# Patient Record
Sex: FEMALE | Race: WHITE | Employment: OTHER | URBAN - METROPOLITAN AREA
[De-identification: names, ages, dates, MRNs, and addresses within clinical notes are randomized per-mention and may not be internally consistent; named-entity substitution may affect disease eponyms.]

---

## 2019-05-20 ENCOUNTER — HOSPITAL ENCOUNTER (INPATIENT)
Facility: HOSPITAL | Age: 84
LOS: 4 days | Discharge: HOME | DRG: 872 | End: 2019-05-25
Attending: EMERGENCY MEDICINE | Admitting: INTERNAL MEDICINE
Payer: MEDICARE

## 2019-05-20 ENCOUNTER — APPOINTMENT (OUTPATIENT)
Dept: GENERAL RADIOLOGY | Facility: HOSPITAL | Age: 84
DRG: 872 | End: 2019-05-20
Attending: EMERGENCY MEDICINE
Payer: MEDICARE

## 2019-05-20 DIAGNOSIS — N30.00 ACUTE CYSTITIS WITHOUT HEMATURIA: Primary | ICD-10-CM

## 2019-05-20 DIAGNOSIS — R00.0 TACHYCARDIA: ICD-10-CM

## 2019-05-20 DIAGNOSIS — J44.1 COPD EXACERBATION (HCC): ICD-10-CM

## 2019-05-20 DIAGNOSIS — N39.0 SEPSIS DUE TO URINARY TRACT INFECTION (HCC): ICD-10-CM

## 2019-05-20 DIAGNOSIS — A41.9 SEPSIS DUE TO URINARY TRACT INFECTION (HCC): ICD-10-CM

## 2019-05-20 PROCEDURE — 71045 X-RAY EXAM CHEST 1 VIEW: CPT | Performed by: EMERGENCY MEDICINE

## 2019-05-20 RX ORDER — METHYLPREDNISOLONE SODIUM SUCCINATE 125 MG/2ML
125 INJECTION, POWDER, LYOPHILIZED, FOR SOLUTION INTRAMUSCULAR; INTRAVENOUS ONCE
Status: COMPLETED | OUTPATIENT
Start: 2019-05-20 | End: 2019-05-20

## 2019-05-20 RX ORDER — ALBUTEROL SULFATE 2.5 MG/3ML
5 SOLUTION RESPIRATORY (INHALATION) CONTINUOUS
Status: DISCONTINUED | OUTPATIENT
Start: 2019-05-20 | End: 2019-05-21 | Stop reason: ALTCHOICE

## 2019-05-20 RX ORDER — LEVOFLOXACIN 5 MG/ML
750 INJECTION, SOLUTION INTRAVENOUS ONCE
Status: COMPLETED | OUTPATIENT
Start: 2019-05-20 | End: 2019-05-21

## 2019-05-21 ENCOUNTER — APPOINTMENT (OUTPATIENT)
Dept: ULTRASOUND IMAGING | Facility: HOSPITAL | Age: 84
DRG: 872 | End: 2019-05-21
Attending: INTERNAL MEDICINE
Payer: MEDICARE

## 2019-05-21 PROBLEM — R00.0 TACHYCARDIA: Status: ACTIVE | Noted: 2019-05-21

## 2019-05-21 PROBLEM — N39.0 SEPSIS DUE TO URINARY TRACT INFECTION (HCC): Status: ACTIVE | Noted: 2019-05-21

## 2019-05-21 PROBLEM — J44.1 COPD EXACERBATION (HCC): Status: ACTIVE | Noted: 2019-05-21

## 2019-05-21 PROBLEM — A41.9 SEPSIS DUE TO URINARY TRACT INFECTION: Status: ACTIVE | Noted: 2019-05-21

## 2019-05-21 PROBLEM — N39.0 SEPSIS DUE TO URINARY TRACT INFECTION: Status: ACTIVE | Noted: 2019-05-21

## 2019-05-21 PROBLEM — A41.9 SEPSIS DUE TO URINARY TRACT INFECTION (HCC): Status: ACTIVE | Noted: 2019-05-21

## 2019-05-21 PROCEDURE — 93923 UPR/LXTR ART STDY 3+ LVLS: CPT | Performed by: INTERNAL MEDICINE

## 2019-05-21 PROCEDURE — 99223 1ST HOSP IP/OBS HIGH 75: CPT | Performed by: INTERNAL MEDICINE

## 2019-05-21 RX ORDER — SODIUM CHLORIDE 9 MG/ML
INJECTION, SOLUTION INTRAVENOUS CONTINUOUS
Status: DISCONTINUED | OUTPATIENT
Start: 2019-05-21 | End: 2019-05-21

## 2019-05-21 RX ORDER — RANITIDINE 150 MG/1
150 TABLET ORAL DAILY
COMMUNITY

## 2019-05-21 RX ORDER — LISINOPRIL 5 MG/1
5 TABLET ORAL DAILY
Status: DISCONTINUED | OUTPATIENT
Start: 2019-05-22 | End: 2019-05-21

## 2019-05-21 RX ORDER — CILOSTAZOL 100 MG/1
100 TABLET ORAL 2 TIMES DAILY
COMMUNITY

## 2019-05-21 RX ORDER — PREDNISONE 10 MG/1
10 TABLET ORAL DAILY
COMMUNITY

## 2019-05-21 RX ORDER — LISINOPRIL AND HYDROCHLOROTHIAZIDE 12.5; 1 MG/1; MG/1
1 TABLET ORAL DAILY
COMMUNITY

## 2019-05-21 RX ORDER — POTASSIUM CHLORIDE 1.5 G/1.77G
40 POWDER, FOR SOLUTION ORAL EVERY 4 HOURS
Status: COMPLETED | OUTPATIENT
Start: 2019-05-21 | End: 2019-05-21

## 2019-05-21 RX ORDER — ALBUTEROL SULFATE 90 UG/1
AEROSOL, METERED RESPIRATORY (INHALATION) AS NEEDED
COMMUNITY

## 2019-05-21 RX ORDER — FLUTICASONE PROPIONATE 50 MCG
1 SPRAY, SUSPENSION (ML) NASAL DAILY
COMMUNITY

## 2019-05-21 RX ORDER — FAMOTIDINE 10 MG
10 TABLET ORAL DAILY
Status: DISCONTINUED | OUTPATIENT
Start: 2019-05-21 | End: 2019-05-25

## 2019-05-21 RX ORDER — ACETAMINOPHEN 500 MG
500 TABLET ORAL EVERY 6 HOURS PRN
Status: DISCONTINUED | OUTPATIENT
Start: 2019-05-21 | End: 2019-05-25

## 2019-05-21 RX ORDER — CARVEDILOL 6.25 MG/1
6.25 TABLET ORAL 2 TIMES DAILY WITH MEALS
COMMUNITY

## 2019-05-21 RX ORDER — LISINOPRIL 5 MG/1
5 TABLET ORAL DAILY
Status: DISCONTINUED | OUTPATIENT
Start: 2019-05-21 | End: 2019-05-25

## 2019-05-21 RX ORDER — METHYLPREDNISOLONE SODIUM SUCCINATE 40 MG/ML
40 INJECTION, POWDER, LYOPHILIZED, FOR SOLUTION INTRAMUSCULAR; INTRAVENOUS EVERY 12 HOURS
Status: DISCONTINUED | OUTPATIENT
Start: 2019-05-21 | End: 2019-05-21

## 2019-05-21 RX ORDER — PREDNISONE 10 MG/1
10 TABLET ORAL
Status: DISCONTINUED | OUTPATIENT
Start: 2019-05-21 | End: 2019-05-25

## 2019-05-21 RX ORDER — HEPARIN SODIUM 5000 [USP'U]/ML
5000 INJECTION, SOLUTION INTRAVENOUS; SUBCUTANEOUS EVERY 12 HOURS SCHEDULED
Status: DISCONTINUED | OUTPATIENT
Start: 2019-05-21 | End: 2019-05-25

## 2019-05-21 RX ORDER — IPRATROPIUM BROMIDE AND ALBUTEROL SULFATE 2.5; .5 MG/3ML; MG/3ML
3 SOLUTION RESPIRATORY (INHALATION)
Status: DISCONTINUED | OUTPATIENT
Start: 2019-05-21 | End: 2019-05-22

## 2019-05-21 RX ORDER — HYDRALAZINE HYDROCHLORIDE 20 MG/ML
10 INJECTION INTRAMUSCULAR; INTRAVENOUS EVERY 6 HOURS PRN
Status: DISCONTINUED | OUTPATIENT
Start: 2019-05-21 | End: 2019-05-25

## 2019-05-21 RX ORDER — VENLAFAXINE 75 MG/1
75 TABLET ORAL NIGHTLY
COMMUNITY

## 2019-05-21 RX ORDER — HYDRALAZINE HYDROCHLORIDE 20 MG/ML
INJECTION INTRAMUSCULAR; INTRAVENOUS
Status: DISPENSED
Start: 2019-05-21 | End: 2019-05-22

## 2019-05-21 RX ORDER — SODIUM CHLORIDE 450 MG/100ML
INJECTION, SOLUTION INTRAVENOUS CONTINUOUS
Status: DISCONTINUED | OUTPATIENT
Start: 2019-05-21 | End: 2019-05-22

## 2019-05-21 RX ORDER — GARLIC EXTRACT 500 MG
1 CAPSULE ORAL DAILY
Status: DISCONTINUED | OUTPATIENT
Start: 2019-05-21 | End: 2019-05-25

## 2019-05-21 RX ORDER — VENLAFAXINE 75 MG/1
150 TABLET ORAL DAILY
COMMUNITY

## 2019-05-21 RX ORDER — VIT A/VIT C/VIT E/ZINC/COPPER 2148-113
TABLET ORAL DAILY
COMMUNITY

## 2019-05-21 NOTE — PLAN OF CARE
Problem: CARDIOVASCULAR - ADULT  Goal: Maintains optimal cardiac output and hemodynamic stability  Description  INTERVENTIONS:  - Monitor vital signs, rhythm, and trends  - Monitor for bleeding, hypotension and signs of decreased cardiac output  - Evalua like us to know as we care for you? I like my room warm and extra blankets.     Provide timely, complete, and accurate information to patient/family  - Incorporate patient and family knowledge, values, beliefs, and cultural backgrounds into the planning an Advance activity as appropriate  - Communicate ordered activity level and limitations with patient/family  Outcome: Not Progressing   Patient is in bed; awake/alert ox3, has fatigue and weakness. SR and vital signs are stable, 02 sats 96% on room air.   D

## 2019-05-21 NOTE — PLAN OF CARE
Dr Tabitha Richard paged regarding patient's high BP. Discontinued 0.9% NaCl and ordered 0.45% NaCl at a lower rate of 50cc/hr. Will continue to monitor.

## 2019-05-21 NOTE — ED NOTES
Sepsis note:     Total volume received  1950 ml    Time Blood Cultures Drawn: 2338    Time first antibiotic started:  2341    Lactic acid = 4.4

## 2019-05-21 NOTE — PROGRESS NOTES
Dr Melissa Pacheco / addendum :   Patient overall feeling much better  Not on pressors and hemodynamically stable  No chest pain or shortness of breath no abdominal pain  Patient chronically on steroid for arthritis which changed to baseline 10 mg once a day  I dis

## 2019-05-21 NOTE — PROGRESS NOTES
[facility]      Sepsis Reassessment Note    /75   Pulse 113   Temp 97.3 °F (36.3 °C) (Temporal)   Resp 19   Ht 5' 3\" (1.6 m)   Wt 143 lb (64.9 kg)   SpO2 94%   BMI 25.33 kg/m²      1:05 AM    Cardiac:  Regularity: Regular  Rate:  Ta

## 2019-05-21 NOTE — PLAN OF CARE
Assessment done on patient and mottling noted on knees and lower extremites. Patient states they were that way yesterday when she came in to the ER. Pedal pulses were assessed and could not be found. Doppler was used and pulse still not found.  Second nurse

## 2019-05-21 NOTE — CONSULTS
[facility]    Report of Consultation    [facility] Patient Status:  Emergency    1935 MRN [id number]   Location [address] Attending No att. providers found   Hosp Day # 0 PCP PHYSICIAN NONSTAFF     Date ADD-vantage 3.375 g Intravenous Once   albuterol sulfate (VENTOLIN) (2.5 MG/3ML) 0.083% nebulizer solution 5 mg 5 mg Nebulization Continuous   Ipratropium Bromide (ATROVENT) 0.02 % nebulizer solution 0.5 mg 0.5 mg Nebulization Continuous       (Not in a ho prior multiple angioplasties and revascularization to the lower extremity  6– history of CAD and HTN and diastolic CHF  LVEF 55 %   7- GI prophylaxis with pepcid / h/o GERD   8- DVT prophylaxis with heparin sq   9–full code    Plan :   Sepsis reassessment

## 2019-05-21 NOTE — PLAN OF CARE
Problem: Patient/Family Goals  Goal: Patient/Family Long Term Goal  Description  Patient's Long Term Goal: To go home    Interventions:  - Monitor labs  -Monitor vitals  -Antibiotics  - See additional Care Plan goals for specific interventions  Outcome: place. Patient complained of a headache and tylenol was given with relief. Pt pedal pulses were unable to be found around 1600. Doppler used and was still not found. Arterial doppler ordered by Dr Peri Bosworth and vascular surgeon was consulted.  Lisinipril resume

## 2019-05-21 NOTE — H&P
[facility]    PATIENT'S NAME: EVERETTE Marroquin   ATTENDING PHYSICIAN: Adrienne Sy MD   PATIENT ACCOUNT#:   [id number]    LOCATION:  [address] [place] [address] RECORD #:   [id number]       YOB: 1935  ADMISSION DATE:       05/20/2019 and the patient denied severe headache, double vision, swallowing difficulty, chest pain, nausea, vomiting, urinary incontinence, neck swelling, calf swelling, tremors, focal weakness of the upper or lower extremities, bodily injuries, or seizure-like acti

## 2019-05-21 NOTE — PROGRESS NOTES
[facility] Pharmacy Note:  Renal Dose Adjustment    Vasyl Triplett has been prescribed famotidine (PEPCID) 10 mg orally every 12 hours. Estimated Creatinine Clearance: 24.2 mL/min (A) (based on SCr of 1.48 mg/dL (H)).     Her calculated creatinine clearance is < 5

## 2019-05-21 NOTE — ED PROVIDER NOTES
Patient Seen in: [facility] AND [facility] Emergency Department    History   Patient presents with:  Nausea/Vomiting/Diarrhea (gastrointestinal)    Stated Complaint: diaphoresis, diarrhea    HPI    71-year-old female with past medical history significant for  O2 Device 05/20/19 2048 None (Room air)       Current:/81   Pulse 119   Temp 97.3 °F (36.3 °C) (Temporal)   Resp 18   Ht 160 cm (5' 3\")   Wt 64.9 kg   SpO2 100%   BMI 25.33 kg/m²         Physical Exam    Physical Exam   Constitutional: AAOx3, well 21.7 (*)     RBC 5.80 (*)     HGB 17.8 (*)     HCT 55.8 (*)     RDW-SD 46.9 (*)     Neutrophil Absolute Prelim 19.53 (*)     All other components within normal limits   CBC WITH DIFFERENTIAL WITH PLATELET    Narrative:      The following orders were created well as a white blood cell count of 21,000. Patient now spells me that she has not been feeling well for the past 2 to 3 days but had just attributed to old age. Patient may have been having symptoms of UTI making her not feel well.   Patient also has ent specified. We recommend that you schedule follow up care with a primary care provider within the next three months to obtain basic health screening including reassessment of your blood pressure.     Medications Prescribed:  There are no discharge medicatio

## 2019-05-21 NOTE — PROGRESS NOTES
[facility]    Progress Note    Shira Rater Patient Status:  Inpatient    1935 MRN [id number]   Location [facility] 2W/SW Attending Catarina Galindo MD   Hosp Day # 0 PCP PHYSICIAN NONSTAFF        Subjective:     Constitutio 137 05/20/2019    K  05/20/2019      Comment:      Test not reported due to hemolysis.        05/20/2019    CO2 21.0 05/20/2019     (H) 05/20/2019    CA 10.3 (H) 05/20/2019       Xr Chest Ap Portable  (cpt=71045)    Result Date: 5/20/2019  CONC

## 2019-05-22 ENCOUNTER — APPOINTMENT (OUTPATIENT)
Dept: GENERAL RADIOLOGY | Facility: HOSPITAL | Age: 84
DRG: 872 | End: 2019-05-22
Attending: INTERNAL MEDICINE
Payer: MEDICARE

## 2019-05-22 PROCEDURE — 99233 SBSQ HOSP IP/OBS HIGH 50: CPT | Performed by: INTERNAL MEDICINE

## 2019-05-22 PROCEDURE — 71045 X-RAY EXAM CHEST 1 VIEW: CPT | Performed by: INTERNAL MEDICINE

## 2019-05-22 RX ORDER — CARVEDILOL 6.25 MG/1
6.25 TABLET ORAL 2 TIMES DAILY WITH MEALS
Status: DISCONTINUED | OUTPATIENT
Start: 2019-05-22 | End: 2019-05-25

## 2019-05-22 RX ORDER — IPRATROPIUM BROMIDE AND ALBUTEROL SULFATE 2.5; .5 MG/3ML; MG/3ML
3 SOLUTION RESPIRATORY (INHALATION) EVERY 6 HOURS PRN
Status: DISCONTINUED | OUTPATIENT
Start: 2019-05-22 | End: 2019-05-25

## 2019-05-22 RX ORDER — LEVOFLOXACIN 5 MG/ML
750 INJECTION, SOLUTION INTRAVENOUS
Status: DISCONTINUED | OUTPATIENT
Start: 2019-05-22 | End: 2019-05-24

## 2019-05-22 NOTE — PLAN OF CARE
PATIENT Matthias Shongaloo PER Dr. Isai Esteban. ORDER PLACED. TRANSFERRING TO ROOM 347. DAUGHTER, MICKIE, UPDATED. REPORT GIVEN TO KELBY RN, ALL QUESTIONS ANSWERED. ALL BELONGINGS SENT WITH PATIENT. Double RN skin check done prior to transfer off Unit.  Ayo Davis

## 2019-05-22 NOTE — PROGRESS NOTES
[facility] Pharmacy Note:  Renal Adjustment for levofloxacin (Curtis Larios)    Melinda Coles is a 82year old female who has been prescribed levofloxacin (LEVAQUIN) 750 mg every 24 hrs.   CrCl is estimated creatinine clearance is 36.6 mL/min (based on SCr of 0.98 mg/dL)

## 2019-05-22 NOTE — PLAN OF CARE
Problem: Patient Centered Care  Goal: Patient preferences are identified and integrated in the patient's plan of care  Description  Interventions:  - What would you like us to know as we care for you? To return home.   - Provide timely, complete, and accu measures for life threatening arrhythmias  - Monitor electrolytes and administer replacement therapy as ordered  Outcome: Progressing     Problem: RESPIRATORY - ADULT  Goal: Achieves optimal ventilation and oxygenation  Description  INTERVENTIONS:  - Asses

## 2019-05-22 NOTE — PROGRESS NOTES
[facility]    Progress Note    Shelbie Novak Patient Status:  Inpatient    1935 MRN [id number]   Location [facility] 2W/SW Attending Angelina Henning MD   Hosp Day # 1 PCP PHYSICIAN NONSTAFF        Subjective:     Jongtiирина K 3.8 05/22/2019     05/22/2019    CO2 19.0 (L) 05/22/2019    GLU 94 05/22/2019    CA 7.9 (L) 05/22/2019    ALB 3.2 (L) 05/22/2019    ALKPHO 46 (L) 05/22/2019    BILT 0.3 05/22/2019    TP 6.3 (L) 05/22/2019    AST 47 (H) 05/22/2019    ALT 31 05/22 for Inpatient Hospitalization - Initial Certification       Patient will require inpatient services that will reasonably be expected to span two midnight's based on the clinical documentation in H+P.    Based on patients current state of illness, I anticipa

## 2019-05-22 NOTE — PLAN OF CARE
Dr Mohit Montes De Oca paged again at 200 regarding patient still being hypertensive.  Passed information on to oncoming RN

## 2019-05-22 NOTE — PROGRESS NOTES
[facility]    Progress Note    Ronald Ashford Patient Status:  Inpatient    1935 MRN [id number]   Location [facility] 2W/SW Attending Jordin Adair MD   Hosp Day # 1 PCP PHYSICIAN NONSTAFF        Subjective:     Constitutio with heparin sq     9- DNR per pt's wishes    10- RA on chronic steroid 10 mg daily     Ok to transfer to [employer]   D/w Daughter and pt   D/w staff             Results:     Lab Results   Component Value Date    WBC 17.6 (H) 05/22/2019    HGB 13.1 05/22/2019 MD  5/22/2019

## 2019-05-22 NOTE — PLAN OF CARE
Problem: Patient Centered Care  Goal: Patient preferences are identified and integrated in the patient's plan of care  Description  Interventions:  - What would you like us to know as we care for you?  Im visiting my daughter from [place].  - Provide time control medications as ordered  - Initiate emergency measures for life threatening arrhythmias  - Monitor electrolytes and administer replacement therapy as ordered  Outcome: Progressing     Problem: RESPIRATORY - ADULT  Goal: Achieves optimal ventilation patient/family  Outcome: Progressing   Patient remains on isolation for rotavirus. Continue antibiotics. Had 1 episode of loose stool. No nausea or vomiting. Last temp 98.3.

## 2019-05-22 NOTE — PLAN OF CARE
Problem: CARDIOVASCULAR - ADULT  Goal: Maintains optimal cardiac output and hemodynamic stability  Description  INTERVENTIONS:  - Monitor vital signs, rhythm, and trends  - Monitor for bleeding, hypotension and signs of decreased cardiac output  - Evalua hydration with IV or PO as ordered and tolerated  - Evaluate effectiveness of GI medications  - Encourage mobilization and activity  - Obtain nutritional consult as needed  - Establish a toileting routine/schedule  - Consider collaborating with pharmacy to

## 2019-05-22 NOTE — PROGRESS NOTES
Paged Dr. Dunia Littlejohn regarding patient Temp of 101.8, HR in the 130's sustained now for more than 30 minutes, and patient stated she wanted to urinate but unable to, Bladder scan done with >900cc in the bladder.  Dr. Dunia Littlejonh ordered to straight cath patient and co

## 2019-05-22 NOTE — PROGRESS NOTES
Dr Fior Lundy called. Update about patient given. Result of arterial doppler of lower extremities relayed with no new order.

## 2019-05-22 NOTE — PROGRESS NOTES
[facility]    Progress Note    Aylin Layton Patient Status:  Inpatient    1935 MRN [id number]   Location [facility] 2W/SW Attending Samantha Her MD   Hosp Day # 1 PCP PHYSICIAN NONSTAFF        Subjective:     Constitutio 05/22/2019    K 3.8 05/22/2019     05/22/2019    CO2 19.0 (L) 05/22/2019    GLU 94 05/22/2019    CA 7.9 (L) 05/22/2019    ALB 3.2 (L) 05/22/2019    ALKPHO 46 (L) 05/22/2019    BILT 0.3 05/22/2019    TP 6.3 (L) 05/22/2019    AST 47 (H) 05/22/2019    A of Need for Inpatient Hospitalization - Initial Certification       Patient will require inpatient services that will reasonably be expected to span two midnight's based on the clinical documentation in H+P.    Based on patients current state of illness, I

## 2019-05-23 PROCEDURE — 99232 SBSQ HOSP IP/OBS MODERATE 35: CPT | Performed by: INTERNAL MEDICINE

## 2019-05-23 RX ORDER — POTASSIUM CHLORIDE 20 MEQ/1
40 TABLET, EXTENDED RELEASE ORAL EVERY 4 HOURS
Status: COMPLETED | OUTPATIENT
Start: 2019-05-23 | End: 2019-05-23

## 2019-05-23 NOTE — PLAN OF CARE
Problem: Patient Centered Care  Goal: Patient preferences are identified and integrated in the patient's plan of care  Description  Interventions:  - What would you like us to know as we care for you?   - Provide timely, complete, and accurate informatio life threatening arrhythmias  - Monitor electrolytes and administer replacement therapy as ordered  Outcome: Progressing     Problem: RESPIRATORY - ADULT  Goal: Achieves optimal ventilation and oxygenation  Description  INTERVENTIONS:  - Assess for changes overnight, maintined on iv abx for uti, isolation for +rotovirus. Patient still complaining of diarrhea. Will monitor patient.

## 2019-05-23 NOTE — PROGRESS NOTES
[facility]     Progress Note        Eliana Rosa Patient Status:  Inpatient    1935 MRN [id number]   Location [facility] 3W/SW Attending Everett Diane MD   Hosp Day # 2 PCP PHYSICIAN NONSTAFF       Subjective:   Patient see rhonchi, crackles  GI: abdomen mild distention  Extremities: no clubbing, cyanosis, edema  Neurologic: no gross motor deficits  Skin: warm, dry      Results:     Lab Results   Component Value Date    WBC 8.6 05/23/2019    HGB 13.7 05/23/2019    HCT 42.4 05 with no acute intrathoracic findings  -Pulmonary status appears to be stable at this time. We will sign off.     Della Rodas, DO  Pulmonary [address]

## 2019-05-23 NOTE — PROGRESS NOTES
[facility] [facility]    Progress Note    Littie Brighter Patient Status:  Inpatient    1935 MRN [id number]   Location [facility] 2W/SW Attending Elver Kincaid MD   Hosp Day # 2 PCP PHYSICIAN NONSTAFF        Subjective:     Constitutio K 3.4 (L) 05/23/2019     05/23/2019    CO2 21.0 05/23/2019    GLU 83 05/23/2019    CA 8.7 05/23/2019    ALB 2.8 (L) 05/23/2019    ALKPHO 45 (L) 05/23/2019    BILT 0.3 05/23/2019    TP 6.2 (L) 05/23/2019    AST 39 (H) 05/23/2019    ALT 32 05/23/201

## 2019-05-23 NOTE — CM/SW NOTE
Received MDO for POLST. Met with patient and daughter Blake Hernandez for assessment. Patient lives in a ranch style home w/ no stairs in [place]. She is currently visiting her daughter Blake Hernandez who lives in [place]. Patient is independent with ADLs/IADLs & drives.  Sh

## 2019-05-23 NOTE — PLAN OF CARE
Problem: Patient Centered Care  Goal: Patient preferences are identified and integrated in the patient's plan of care  Description  Interventions:  - What would you like us to know as we care for you?  Lives in [place]  - Provide timely, complete, and accur effectiveness of antiarrhythmic and heart rate control medications as ordered  - Initiate emergency measures for life threatening arrhythmias  - Monitor electrolytes and administer replacement therapy as ordered  Outcome: Progressing     Problem: RESPIRATO ordered activity level and limitations with patient/family  Outcome: Progressing    Patient is resting comfortably in bed at this time. Patient is still having loose stools but less frequently. IV abx continuing. Isolation precautions maintained.  K+ covere

## 2019-05-24 RX ORDER — LEVOFLOXACIN 5 MG/ML
750 INJECTION, SOLUTION INTRAVENOUS EVERY 24 HOURS
Status: DISCONTINUED | OUTPATIENT
Start: 2019-05-24 | End: 2019-05-24

## 2019-05-24 RX ORDER — LEVOFLOXACIN 750 MG/1
750 TABLET ORAL EVERY 24 HOURS
Status: DISCONTINUED | OUTPATIENT
Start: 2019-05-24 | End: 2019-05-25

## 2019-05-24 NOTE — PROGRESS NOTES
[facility] Pharmacy Note: Route Optimization for Levofloxacin [facility])    Patient is currently on Levofloxacin (LEVAQUIN) 750 mg IV every 24 hours.    The patient meets the criteria to convert to the oral equivalent as established by the IV to Oral conversion pr

## 2019-05-24 NOTE — PROGRESS NOTES
[place] [facility]    Progress Note    Sally Rawls Patient Status:  Inpatient    1935 MRN [id number]   Location [facility] 2W/SW Attending Teddy Canchola MD   Hosp Day # 3 PCP PHYSICIAN NONSTAFF        Subjective:     Constitutio 05/23/2019    K 4.7 05/24/2019     05/23/2019    CO2 26.0 05/23/2019    GLU 88 05/23/2019    CA 8.9 05/23/2019    ALB 2.8 (L) 05/23/2019    ALKPHO 45 (L) 05/23/2019    BILT 0.3 05/23/2019    TP 6.2 (L) 05/23/2019    AST 39 (H) 05/23/2019    ALT 32 05

## 2019-05-24 NOTE — PLAN OF CARE
Problem: Patient Centered Care  Goal: Patient preferences are identified and integrated in the patient's plan of care  Description  Interventions:  - What would you like us to know as we care for you? Home alone. Lives in [place], here to visit daughter. Evaluate fluid balance, assess for edema, trend weights  5/24/2019 0500 by Torito Araujo, RN  Outcome: Progressing  5/24/2019 0458 by Torito Araujo, RN  Outcome: Progressing  Goal: Absence of cardiac arrhythmias or at baseline  Description  INTER collaborating with pharmacy to review patient's medication profile  5/24/2019 3460 by Masoud Fatima, RN  Outcome: Progressing  5/24/2019 0451 by Masoud Fatima, RN  Outcome: Progressing     Problem: MUSCULOSKELETAL - ADULT  Goal: Return mobility t

## 2019-05-24 NOTE — PROGRESS NOTES
[facility] Pharmacy Note:  Renal Adjustment for levofloxacin (Kaykay Lundy)    Cr Knight is a 82year old female who has been prescribed levofloxacin (LEVAQUIN) 750 mg every 48 hrs. CrCl is estimated creatinine clearance is 51.3 mL/min (based on SCr of 0.7 mg/dL).

## 2019-05-25 VITALS
BODY MASS INDEX: 23.89 KG/M2 | SYSTOLIC BLOOD PRESSURE: 132 MMHG | RESPIRATION RATE: 20 BRPM | TEMPERATURE: 98 F | DIASTOLIC BLOOD PRESSURE: 89 MMHG | HEIGHT: 63 IN | WEIGHT: 134.81 LBS | OXYGEN SATURATION: 95 % | HEART RATE: 82 BPM

## 2019-05-25 RX ORDER — ACETAMINOPHEN 500 MG
500 TABLET ORAL EVERY 6 HOURS PRN
Refills: 0 | Status: SHIPPED | COMMUNITY
Start: 2019-05-25

## 2019-05-25 NOTE — PLAN OF CARE
Problem: Patient Centered Care  Goal: Patient preferences are identified and integrated in the patient's plan of care  Description  Interventions:  - What would you like us to know as we care for you?   - Provide timely, complete, and accurate informatio life threatening arrhythmias  - Monitor electrolytes and administer replacement therapy as ordered  Outcome: Progressing     Problem: RESPIRATORY - ADULT  Goal: Achieves optimal ventilation and oxygenation  Description  INTERVENTIONS:  - Assess for changes complaints of pain or discomfort. VSS. For DC today.

## 2019-05-25 NOTE — PLAN OF CARE
Problem: Patient Centered Care  Goal: Patient preferences are identified and integrated in the patient's plan of care  Description  Interventions:  - What would you like us to know as we care for you?  - Provide timely, complete, and accurate information weights  5/25/2019 1154 by Prashant Hurley, RN  Outcome: Adequate for Discharge  5/25/2019 1153 by Prashant Hurley RN  Outcome: Progressing  Goal: Absence of cardiac arrhythmias or at baseline  Description  INTERVENTIONS:  - Continuous cardiac monitoring, m patient's medication profile  5/25/2019 1154 by Elsy Ames RN  Outcome: Adequate for Discharge  5/25/2019 1153 by Elsy Ames RN  Outcome: Progressing     Problem: MUSCULOSKELETAL - ADULT  Goal: Return mobility to safest level of function  Descrip

## 2019-05-25 NOTE — PLAN OF CARE
Problem: Patient Centered Care  Goal: Patient preferences are identified and integrated in the patient's plan of care  Description  Interventions:  - What would you like us to know as we care for you?  - Provide timely, complete, and accurate information life threatening arrhythmias  - Monitor electrolytes and administer replacement therapy as ordered  Outcome: Progressing     Problem: RESPIRATORY - ADULT  Goal: Achieves optimal ventilation and oxygenation  Description  INTERVENTIONS:  - Assess for changes

## 2019-05-28 ENCOUNTER — TELEPHONE (OUTPATIENT)
Dept: MEDSURG UNIT | Facility: HOSPITAL | Age: 84
End: 2019-05-28

## 2019-08-06 NOTE — DISCHARGE SUMMARY
[facility]    PATIENT'S NAME: EVERETTE Mccabe   ATTENDING PHYSICIAN: Adrienne Dumont MD   PATIENT ACCOUNT#:   [id number]    LOCATION:  [address] [place] [address] RECORD #:   [id number]       YOB: 1935  ADMISSION DATE:       05/20/2019